# Patient Record
Sex: MALE | Race: WHITE | NOT HISPANIC OR LATINO | Employment: FULL TIME | URBAN - NONMETROPOLITAN AREA
[De-identification: names, ages, dates, MRNs, and addresses within clinical notes are randomized per-mention and may not be internally consistent; named-entity substitution may affect disease eponyms.]

---

## 2017-03-13 ENCOUNTER — OFFICE VISIT (OUTPATIENT)
Dept: GASTROENTEROLOGY | Facility: CLINIC | Age: 52
End: 2017-03-13

## 2017-03-13 VITALS
HEIGHT: 73 IN | BODY MASS INDEX: 27.04 KG/M2 | DIASTOLIC BLOOD PRESSURE: 80 MMHG | WEIGHT: 204 LBS | SYSTOLIC BLOOD PRESSURE: 122 MMHG | HEART RATE: 72 BPM | OXYGEN SATURATION: 98 %

## 2017-03-13 DIAGNOSIS — Z12.11 ENCOUNTER FOR SCREENING FOR MALIGNANT NEOPLASM OF COLON: Primary | ICD-10-CM

## 2017-03-13 PROCEDURE — S0260 H&P FOR SURGERY: HCPCS | Performed by: CLINICAL NURSE SPECIALIST

## 2017-03-13 RX ORDER — SODIUM, POTASSIUM,MAG SULFATES 17.5-3.13G
SOLUTION, RECONSTITUTED, ORAL ORAL
Qty: 2 BOTTLE | Refills: 0 | Status: ON HOLD | OUTPATIENT
Start: 2017-03-13 | End: 2017-04-14

## 2017-03-13 NOTE — PROGRESS NOTES
Logan Suarez  1965      3/13/2017  Chief Complaint   Patient presents with   • Colonoscopy     New patient ref by Dr. Hawley      Subjective   HPI  Logan Suarez is a 51 y.o. male who presents as a referral for preventative maintenance. He has no complaints of nausea or vomiting. No change in bowels. No wt loss. No BRBPR. No melena. There is a negative family hx for colon cancer. No abdominal pain.  Past Medical History   Diagnosis Date   • Depression    • Disease of thyroid gland    • Hearing loss    • Hypercholesteremia      Past Surgical History   Procedure Laterality Date   • Appendectomy     • Vasectomy       Outpatient Prescriptions Marked as Taking for the 3/13/17 encounter (Office Visit) with MATT Kiran   Medication Sig Dispense Refill   • atorvastatin (LIPITOR) 40 MG tablet Take 40 mg by mouth Daily.     • levothyroxine (SYNTHROID, LEVOTHROID) 137 MCG tablet Take 137 mcg by mouth Daily.     • NIACIN CR PO Take  by mouth Daily.     • PARoxetine (PAXIL) 20 MG tablet Take 20 mg by mouth Every Morning.       No Known Allergies  Social History     Social History   • Marital status:      Spouse name: N/A   • Number of children: N/A   • Years of education: N/A     Occupational History   • Not on file.     Social History Main Topics   • Smoking status: Never Smoker   • Smokeless tobacco: Not on file   • Alcohol use No   • Drug use: Not on file   • Sexual activity: Not on file     Other Topics Concern   • Not on file     Social History Narrative     Family History   Problem Relation Age of Onset   • Colon cancer Neg Hx    • Colon polyps Neg Hx      Health Maintenance   Topic Date Due   • HEPATITIS C SCREENING  1965   • TDAP/TD VACCINES (1 - Tdap) 12/01/1984   • INFLUENZA VACCINE  08/01/2016       REVIEW OF SYSTEMS  General: well appearing, no fever chills or sweats, no unexplained wt loss  HEENT: no acute visual or hearing disturbances  Cardiovascular: No chest pain or  "palpitations  Pulmonary: No shortness of breath, coughing, wheezing or hemoptysis  : No burning, urgency, hematuria, or dysuria  Musculoskeletal: No joint pain or stiffness  Peripheral: no edema  Skin: No lesions or rashes  Neuro: No dizziness, headaches, stroke, syncope  Endocrine: No hot or cold intolerances  Hematological: No blood dyscrasias    Objective   Vitals:    03/13/17 1449   BP: 122/80   Pulse: 72   SpO2: 98%   Weight: 204 lb (92.5 kg)   Height: 73\" (185.4 cm)     Body mass index is 26.91 kg/(m^2).    PHYSICAL EXAM  General: age appropriate well nourished well appearing, no acute distress  Head: normocephalic and atraumatic  Global assessment-supple  Neck-No JVD noted, no lymphadenopathy  Pulmonary-clear to auscultation bilaterally, normal respiratory effort  Cardiovascular-normal rate and rhythm, normal heart sounds, S1 and S2 noted  Abdomen-soft, non tender, non distended, normal bowel sounds all 4 quadrants, no hepatosplenomegaly noted  Extremities-No clubbing cyanosis or edema  Neuro-Non focal, converses appropriately, awake, alert, oriented    Assessment/Plan     Logan was seen today for colonoscopy.    Diagnoses and all orders for this visit:    Encounter for screening for malignant neoplasm of colon  -     SUPREP BOWEL PREP solution oral solution; Take as directed by office instructions provided  -     Case Request; Standing  -     Implement Anesthesia Orders Day of Procedure; Standing  -     Obtain Informed Consent; Standing  -     Verify Informed Consent; Standing  -     Verify bowel prep was successful; Standing  -     Case Request        COLONOSCOPY WITH ANESTHESIA (N/A)  Body mass index is 26.91 kg/(m^2).    Patient instructions on prep prior to procedure provided to the patient.    All risks, benefits, alternatives, and indications of colonoscopy procedure have been discussed with the patient. Risks to include perforation of the colon requiring possible surgery or colostomy, risk of " bleeding from biopsies or removal of colon tissue, possibility of missing a colon polyp or cancer, or adverse drug reaction.  Benefits to include the diagnosis and management of disease of the colon and rectum. Alternatives to include barium enema, radiographic evaluation, lab testing or no intervention. Pt verbalizes understanding and agrees.

## 2017-04-11 ENCOUNTER — ANESTHESIA EVENT (OUTPATIENT)
Dept: GASTROENTEROLOGY | Facility: HOSPITAL | Age: 52
End: 2017-04-11

## 2017-04-14 ENCOUNTER — HOSPITAL ENCOUNTER (OUTPATIENT)
Facility: HOSPITAL | Age: 52
Setting detail: HOSPITAL OUTPATIENT SURGERY
Discharge: HOME | End: 2017-04-14
Attending: INTERNAL MEDICINE | Admitting: INTERNAL MEDICINE

## 2017-04-14 ENCOUNTER — ANESTHESIA (OUTPATIENT)
Dept: GASTROENTEROLOGY | Facility: HOSPITAL | Age: 52
End: 2017-04-14

## 2017-04-14 VITALS
TEMPERATURE: 98.3 F | OXYGEN SATURATION: 98 % | DIASTOLIC BLOOD PRESSURE: 53 MMHG | WEIGHT: 204 LBS | BODY MASS INDEX: 27.04 KG/M2 | HEIGHT: 73 IN | HEART RATE: 73 BPM | RESPIRATION RATE: 17 BRPM | SYSTOLIC BLOOD PRESSURE: 113 MMHG

## 2017-04-14 DIAGNOSIS — Z12.11 ENCOUNTER FOR SCREENING FOR MALIGNANT NEOPLASM OF COLON: ICD-10-CM

## 2017-04-14 PROCEDURE — 45378 DIAGNOSTIC COLONOSCOPY: CPT | Performed by: INTERNAL MEDICINE

## 2017-04-14 PROCEDURE — 25010000002 PROPOFOL 10 MG/ML EMULSION: Performed by: NURSE ANESTHETIST, CERTIFIED REGISTERED

## 2017-04-14 RX ORDER — IBUPROFEN 200 MG
200 TABLET ORAL EVERY 6 HOURS PRN
COMMUNITY

## 2017-04-14 RX ORDER — SODIUM CHLORIDE 9 MG/ML
100 INJECTION, SOLUTION INTRAVENOUS CONTINUOUS
Status: CANCELLED | OUTPATIENT
Start: 2017-04-14

## 2017-04-14 RX ORDER — SODIUM CHLORIDE 0.9 % (FLUSH) 0.9 %
1-10 SYRINGE (ML) INJECTION AS NEEDED
Status: DISCONTINUED | OUTPATIENT
Start: 2017-04-14 | End: 2017-04-14 | Stop reason: HOSPADM

## 2017-04-14 RX ORDER — SODIUM CHLORIDE 0.9 % (FLUSH) 0.9 %
1-10 SYRINGE (ML) INJECTION AS NEEDED
Status: CANCELLED | OUTPATIENT
Start: 2017-04-14

## 2017-04-14 RX ORDER — SODIUM CHLORIDE 9 MG/ML
100 INJECTION, SOLUTION INTRAVENOUS CONTINUOUS
Status: DISCONTINUED | OUTPATIENT
Start: 2017-04-14 | End: 2017-04-14 | Stop reason: HOSPADM

## 2017-04-14 RX ORDER — PROPOFOL 10 MG/ML
VIAL (ML) INTRAVENOUS AS NEEDED
Status: DISCONTINUED | OUTPATIENT
Start: 2017-04-14 | End: 2017-04-14 | Stop reason: SURG

## 2017-04-14 RX ORDER — CHLORAL HYDRATE 500 MG
CAPSULE ORAL
COMMUNITY

## 2017-04-14 RX ADMIN — PROPOFOL 50 MG: 10 INJECTION, EMULSION INTRAVENOUS at 11:59

## 2017-04-14 RX ADMIN — PROPOFOL 50 MG: 10 INJECTION, EMULSION INTRAVENOUS at 11:52

## 2017-04-14 RX ADMIN — PROPOFOL 50 MG: 10 INJECTION, EMULSION INTRAVENOUS at 11:55

## 2017-04-14 RX ADMIN — SODIUM CHLORIDE 100 ML/HR: 9 INJECTION, SOLUTION INTRAVENOUS at 11:20

## 2017-04-14 RX ADMIN — PROPOFOL 50 MG: 10 INJECTION, EMULSION INTRAVENOUS at 11:56

## 2017-04-14 RX ADMIN — PROPOFOL 50 MG: 10 INJECTION, EMULSION INTRAVENOUS at 11:58

## 2017-04-14 RX ADMIN — PROPOFOL 50 MG: 10 INJECTION, EMULSION INTRAVENOUS at 11:53

## 2017-04-14 NOTE — ANESTHESIA POSTPROCEDURE EVALUATION
Patient: Logan Suarez    Procedure Summary     Date Anesthesia Start Anesthesia Stop Room / Location    04/14/17 1145 1207  [facility] /  [facility]       Procedure Diagnosis Surgeon Provider    COLONOSCOPY WITH ANESTHESIA (N/A ) Encounter for screening for malignant neoplasm of colon  (Encounter for screening for malignant neoplasm of colon [Z12.11]) MD Sim Schultz CRNA          Anesthesia Type: general  Last vitals  BP      Temp      Pulse     Resp      SpO2        Post Anesthesia Care and Evaluation    Patient location during evaluation: PHASE II  Patient participation: complete - patient participated  Level of consciousness: awake and sleepy but conscious  Pain score: 0  Pain management: adequate  Airway patency: patent  Anesthetic complications: No anesthetic complications    Cardiovascular status: acceptable  Respiratory status: acceptable  Hydration status: acceptable

## 2017-04-14 NOTE — PLAN OF CARE
Problem: Patient Care Overview (Adult)  Goal: Plan of Care Review  Outcome: Ongoing (interventions implemented as appropriate)    04/14/17 1201   Coping/Psychosocial Response Interventions   Plan Of Care Reviewed With patient   Patient Care Overview   Progress no change   Outcome Evaluation   Outcome Summary/Follow up Plan tolerating well         Problem: GI Endoscopy (Adult)  Goal: Signs and Symptoms of Listed Potential Problems Will be Absent or Manageable (GI Endoscopy)  Outcome: Ongoing (interventions implemented as appropriate)

## 2017-04-14 NOTE — PLAN OF CARE
Problem: Patient Care Overview (Adult)  Goal: Plan of Care Review  Outcome: Outcome(s) achieved Date Met:  04/14/17 04/14/17 1234   Coping/Psychosocial Response Interventions   Plan Of Care Reviewed With patient;spouse   Patient Care Overview   Progress improving   Outcome Evaluation   Outcome Summary/Follow up Plan DISCHARGE CRITERIA MET

## 2017-04-14 NOTE — PLAN OF CARE
Problem: GI Endoscopy (Adult)  Goal: Signs and Symptoms of Listed Potential Problems Will be Absent or Manageable (GI Endoscopy)  Outcome: Outcome(s) achieved Date Met:  04/14/17 04/14/17 1234   GI Endoscopy   Problems Assessed (GI Endoscopy) all   Problems Present (GI Endoscopy) none

## 2017-04-14 NOTE — ANESTHESIA PREPROCEDURE EVALUATION
Anesthesia Evaluation     Patient summary reviewed   no history of anesthetic complications:  NPO Status: > 8 hours   Airway   Mallampati: II  TM distance: >3 FB  Neck ROM: full  Dental      Pulmonary    (-) asthma, sleep apnea, not a smoker  Cardiovascular   Exercise tolerance: excellent (>7 METS)    (+) hyperlipidemia  (-) pacemaker, past MI, angina, cardiac stents      Neuro/Psych  (-) seizures, CVA  GI/Hepatic/Renal/Endo    (+)  hypothyroidism,   (-) GERD, liver disease, renal disease, diabetes    Musculoskeletal     Abdominal    Substance History      OB/GYN          Other                                    Anesthesia Plan    ASA 2     general     intravenous induction   Anesthetic plan and risks discussed with patient.

## 2017-04-17 ENCOUNTER — TELEPHONE (OUTPATIENT)
Dept: GASTROENTEROLOGY | Facility: CLINIC | Age: 52
End: 2017-04-17

## 2017-08-03 NOTE — H&P
"[facility]  Pre Procedure History & Physical    Chief Complaint:   Screening    Subjective     HPI:   Here for screening colonoscopy.  Please see office note dated 3/13/2017    Past Medical History:   Past Medical History:   Diagnosis Date   • Depression    • Disease of thyroid gland    • Hearing loss    • Hypercholesteremia        Past Surgical History:  [unfilled]    Family History:  Family History   Problem Relation Age of Onset   • Colon cancer Neg Hx    • Colon polyps Neg Hx        Social History:   reports that he has never smoked. He has never used smokeless tobacco. He reports that he does not drink alcohol or use illicit drugs.    Medications:   Prior to Admission medications    Medication Sig Start Date End Date Taking? Authorizing Provider   atorvastatin (LIPITOR) 40 MG tablet Take 40 mg by mouth Daily.   Yes Historical Provider, MD   ibuprofen (ADVIL,MOTRIN) 200 MG tablet Take 200 mg by mouth Every 6 (Six) Hours As Needed for Mild Pain (1-3).   Yes Historical Provider, MD   levothyroxine (SYNTHROID, LEVOTHROID) 137 MCG tablet Take 137 mcg by mouth Daily.   Yes Historical Provider, MD   NIACIN CR PO Take  by mouth Daily.   Yes Historical Provider, MD   Omega-3 Fatty Acids (FISH OIL) 1000 MG capsule capsule Take  by mouth Daily With Breakfast.   Yes Historical Provider, MD   PARoxetine (PAXIL) 20 MG tablet Take 20 mg by mouth Every Morning.   Yes Historical Provider, MD   SUPREP BOWEL PREP solution oral solution Take as directed by office instructions provided 3/13/17 4/14/17  MATT Kiran       Allergies:  Review of patient's allergies indicates no known allergies.    Objective     Blood pressure 130/76, pulse 83, temperature 98.3 °F (36.8 °C), temperature source Temporal Artery , resp. rate 18, height 73\" (185.4 cm), weight 204 lb (92.5 kg), SpO2 98 %.    Physical Exam   Constitutional: Pt is oriented to person, place, and in no distress.   HENT: Mouth/Throat: Oropharynx is " clear.   Cardiovascular: Normal rate, regular rhythm.    Pulmonary/Chest: Effort normal. No respiratory distress. No  wheezes.   Abdominal: Soft. Non-distended.  Skin: Skin is warm and dry.   Psychiatric: Mood, memory, affect and judgment appear normal.     Assessment/Plan     Diagnosis:  Screening:    Anticipated Surgical Procedure:     colonoscopy as scheduled    The risks, benefits, and alternatives of this procedure have been discussed with the patient or the responsible party- the patient understands and agrees to proceed.    EMR Dragon/transcription disclaimer: Much of this encounter note is an electronic transcription/translation of spoken language to printed text.  The electronic translation of spoken language may permit erroneous, or at times, nonsensical words or phrases to be inadvertently transcribed.  Although I have reviewed the note for such errors, some may still exist.    Alok Quintanilla MD  11:51 AM  4/14/2017     Name band;

## 2020-11-10 ENCOUNTER — OFFICE VISIT (OUTPATIENT)
Age: 55
End: 2020-11-10

## 2020-11-10 VITALS — HEART RATE: 90 BPM | OXYGEN SATURATION: 94 % | TEMPERATURE: 98.6 F

## 2020-11-13 LAB — SARS-COV-2, NAA: DETECTED

## 2020-11-16 ENCOUNTER — APPOINTMENT (OUTPATIENT)
Dept: GENERAL RADIOLOGY | Age: 55
End: 2020-11-16
Payer: COMMERCIAL

## 2020-11-16 ENCOUNTER — HOSPITAL ENCOUNTER (EMERGENCY)
Age: 55
Discharge: HOME | End: 2020-11-16
Payer: COMMERCIAL

## 2020-11-16 VITALS
RESPIRATION RATE: 14 BRPM | SYSTOLIC BLOOD PRESSURE: 128 MMHG | OXYGEN SATURATION: 96 % | DIASTOLIC BLOOD PRESSURE: 80 MMHG | HEIGHT: 73 IN | HEART RATE: 63 BPM | BODY MASS INDEX: 28.36 KG/M2 | WEIGHT: 214 LBS | TEMPERATURE: 98.4 F

## 2020-11-16 LAB
ALBUMIN SERPL-MCNC: 4 G/DL (ref 3.5–5.2)
ALP BLD-CCNC: 82 U/L (ref 40–130)
ALT SERPL-CCNC: 32 U/L (ref 5–41)
ANION GAP SERPL CALCULATED.3IONS-SCNC: 13 MMOL/L (ref 7–19)
AST SERPL-CCNC: 33 U/L (ref 5–40)
BASOPHILS ABSOLUTE: 0 K/UL (ref 0–0.2)
BASOPHILS RELATIVE PERCENT: 0.2 % (ref 0–1)
BILIRUB SERPL-MCNC: 0.6 MG/DL (ref 0.2–1.2)
BUN BLDV-MCNC: 19 MG/DL (ref 6–20)
CALCIUM SERPL-MCNC: 8.8 MG/DL (ref 8.6–10)
CHLORIDE BLD-SCNC: 102 MMOL/L (ref 98–111)
CO2: 24 MMOL/L (ref 22–29)
CREAT SERPL-MCNC: 0.9 MG/DL (ref 0.5–1.2)
D DIMER: 0.79 UG/ML FEU (ref 0–0.48)
EKG P AXIS: 10 DEGREES
EKG P-R INTERVAL: 174 MS
EKG Q-T INTERVAL: 430 MS
EKG QRS DURATION: 100 MS
EKG QTC CALCULATION (BAZETT): 430 MS
EKG T AXIS: -11 DEGREES
EOSINOPHILS ABSOLUTE: 0 K/UL (ref 0–0.6)
EOSINOPHILS RELATIVE PERCENT: 0.2 % (ref 0–5)
GFR AFRICAN AMERICAN: >59
GFR NON-AFRICAN AMERICAN: >60
GLUCOSE BLD-MCNC: 99 MG/DL (ref 74–109)
HCT VFR BLD CALC: 45.1 % (ref 42–52)
HEMOGLOBIN: 15.6 G/DL (ref 14–18)
IMMATURE GRANULOCYTES #: 0 K/UL
LYMPHOCYTES ABSOLUTE: 0.7 K/UL (ref 1.1–4.5)
LYMPHOCYTES RELATIVE PERCENT: 12 % (ref 20–40)
MCH RBC QN AUTO: 32.7 PG (ref 27–31)
MCHC RBC AUTO-ENTMCNC: 34.6 G/DL (ref 33–37)
MCV RBC AUTO: 94.5 FL (ref 80–94)
MONOCYTES ABSOLUTE: 0.2 K/UL (ref 0–0.9)
MONOCYTES RELATIVE PERCENT: 3.6 % (ref 0–10)
NEUTROPHILS ABSOLUTE: 4.6 K/UL (ref 1.5–7.5)
NEUTROPHILS RELATIVE PERCENT: 83.6 % (ref 50–65)
PDW BLD-RTO: 12.6 % (ref 11.5–14.5)
PLATELET # BLD: 168 K/UL (ref 130–400)
PMV BLD AUTO: 10.1 FL (ref 9.4–12.4)
POTASSIUM REFLEX MAGNESIUM: 4.2 MMOL/L (ref 3.5–5)
RBC # BLD: 4.77 M/UL (ref 4.7–6.1)
SODIUM BLD-SCNC: 139 MMOL/L (ref 136–145)
TOTAL PROTEIN: 7.3 G/DL (ref 6.6–8.7)
TROPONIN: <0.01 NG/ML (ref 0–0.03)
WBC # BLD: 5.5 K/UL (ref 4.8–10.8)

## 2020-11-16 PROCEDURE — 93010 ELECTROCARDIOGRAM REPORT: CPT | Performed by: INTERNAL MEDICINE

## 2020-11-16 PROCEDURE — 2580000003 HC RX 258: Performed by: NURSE PRACTITIONER

## 2020-11-16 PROCEDURE — 36415 COLL VENOUS BLD VENIPUNCTURE: CPT

## 2020-11-16 PROCEDURE — 80053 COMPREHEN METABOLIC PANEL: CPT

## 2020-11-16 PROCEDURE — 93005 ELECTROCARDIOGRAM TRACING: CPT | Performed by: NURSE PRACTITIONER

## 2020-11-16 PROCEDURE — 84484 ASSAY OF TROPONIN QUANT: CPT

## 2020-11-16 PROCEDURE — 85379 FIBRIN DEGRADATION QUANT: CPT

## 2020-11-16 PROCEDURE — 87040 BLOOD CULTURE FOR BACTERIA: CPT

## 2020-11-16 PROCEDURE — 85025 COMPLETE CBC W/AUTO DIFF WBC: CPT

## 2020-11-16 PROCEDURE — 99284 EMERGENCY DEPT VISIT MOD MDM: CPT

## 2020-11-16 PROCEDURE — 71045 X-RAY EXAM CHEST 1 VIEW: CPT

## 2020-11-16 PROCEDURE — 99999 PR OFFICE/OUTPT VISIT,PROCEDURE ONLY: CPT | Performed by: NURSE PRACTITIONER

## 2020-11-16 RX ORDER — LEVOTHYROXINE SODIUM 137 UG/1
137 TABLET ORAL DAILY
COMMUNITY

## 2020-11-16 RX ORDER — ATORVASTATIN CALCIUM 40 MG/1
40 TABLET, FILM COATED ORAL DAILY
COMMUNITY

## 2020-11-16 RX ORDER — SODIUM CHLORIDE 9 MG/ML
1000 INJECTION, SOLUTION INTRAVENOUS CONTINUOUS
Status: DISCONTINUED | OUTPATIENT
Start: 2020-11-16 | End: 2020-11-16 | Stop reason: HOSPADM

## 2020-11-16 RX ORDER — METHYLPREDNISOLONE 4 MG/1
TABLET ORAL
Qty: 1 KIT | Refills: 0 | Status: SHIPPED | OUTPATIENT
Start: 2020-11-16 | End: 2020-11-22

## 2020-11-16 RX ORDER — PAROXETINE HYDROCHLORIDE 20 MG/1
20 TABLET, FILM COATED ORAL EVERY MORNING
COMMUNITY

## 2020-11-16 RX ADMIN — SODIUM CHLORIDE 1000 ML: 9 INJECTION, SOLUTION INTRAVENOUS at 12:43

## 2020-11-16 SDOH — HEALTH STABILITY: MENTAL HEALTH: HOW OFTEN DO YOU HAVE A DRINK CONTAINING ALCOHOL?: NEVER

## 2020-11-16 ASSESSMENT — ENCOUNTER SYMPTOMS
ABDOMINAL PAIN: 0
SHORTNESS OF BREATH: 1

## 2020-11-16 NOTE — ED NOTES
Bed: 13  Expected date:   Expected time:   Means of arrival:   Comments:  Open at [id number], RN  11/16/20 3103

## 2020-11-16 NOTE — ED PROVIDER NOTES
[facility] EMERGENCY DEPT  eMERGENCY dEPARTMENT eNCOUnter      Pt Name: Jian Cantu  MRN: [id number]  [place] 1965  Date of evaluation: 11/16/2020  Provider: SHANON Bowens    CHIEF COMPLAINT       Chief Complaint   Patient presents with    Shortness of Breath     Tested positive for Covid last Tuesday, Now having increased SOA         HISTORY OF PRESENT ILLNESS   (Location/Symptom, Timing/Onset,Context/Setting, Quality, Duration, Modifying Factors, Severity)  Note limiting factors. formerly [facility] a 47 y.o. male who presents to the emergency department for evaluation of shortness of breath. Pt had positive outpatient testing for covid-19 ten days ago. He relates that he has had progressive worsening of shortness of breath. He relates that he has had soa with walking activity. He has had subjective fevers and malaise. He has had no abdominal pain, vomiting, diarrhea or chest pain. He denies known history of heart disease. He does not smoke cigarettes. [facility]    Nursing Notes were reviewed. REVIEW OF SYSTEMS    (2-9 systems for level 4, 10 or more for level 5)     Review of Systems   Constitutional: Positive for fever. Respiratory: Positive for shortness of breath. Cardiovascular: Negative for chest pain. Gastrointestinal: Negative for abdominal pain. All other systems reviewed and are negative. A complete review of systems was performed and is negative except as noted above in the HPI.        PAST MEDICAL HISTORY     Past Medical History:   Diagnosis Date    Depression     Hyperlipidemia     Thyroid disease          SURGICAL HISTORY       Past Surgical History:   Procedure Laterality Date    APPENDECTOMY      VASECTOMY           CURRENT MEDICATIONS       Previous Medications    ATORVASTATIN (LIPITOR) 40 MG TABLET    Take 40 mg by mouth daily    LEVOTHYROXINE (SYNTHROID) 137 MCG TABLET    Take 137 mcg by mouth Daily    PAROXETINE (PAXIL) 20 MG TABLET    Take 20 mg by mouth every morning ALLERGIES     Patient has no known allergies. FAMILY HISTORY     History reviewed. No pertinent family history.        SOCIAL HISTORY       Social History     Socioeconomic History    Marital status:      Spouse name: None    Number of children: None    Years of education: None    Highest education level: None   Occupational History    None   Social Needs    Financial resource strain: None    Food insecurity     Worry: None     Inability: None    Transportation needs     Medical: None     Non-medical: None   Tobacco Use    Smoking status: Never Smoker    Smokeless tobacco: Never Used   Substance and Sexual Activity    Alcohol use: Never     Frequency: Never    Drug use: Never    Sexual activity: Yes     Partners: Female   Lifestyle    Physical activity     Days per week: None     Minutes per session: None    Stress: None   Relationships    Social connections     Talks on phone: None     Gets together: None     Attends [employer] service: None     Active member of club or organization: None     Attends meetings of clubs or organizations: None     Relationship status: None    Intimate partner violence     Fear of current or ex partner: None     Emotionally abused: None     Physically abused: None     Forced sexual activity: None   Other Topics Concern    None   Social History Narrative    None       SCREENINGS    [place] Coma Scale  Eye Opening: Spontaneous  Best Verbal Response: Oriented  Best Motor Response: Obeys commands  [place] Coma Scale Score: 15        PHYSICAL EXAM    (up to 7 for level 4, 8 or more for level 5)     ED Triage Vitals [11/16/20 0955]   BP Temp Temp src Pulse Resp SpO2 Height Weight   136/82 98.5 °F (36.9 °C) -- 66 18 (!) 88 % 6' 1\" (1.854 m) 214 lb (97.1 kg)     Vitals:    11/16/20 0955 11/16/20 1230   BP: 136/82 128/76   Pulse: 66 66   Resp: 18 15   Temp: 98.5 °F (36.9 °C)    SpO2: (!) 88% 97%   Weight: 214 lb (97.1 kg)    Height: 6' 1\" (1.854 m) DIFFERENTIAL - Abnormal; Notable for the following components:       Result Value    MCV 94.5 (*)     MCH 32.7 (*)     Neutrophils % 83.6 (*)     Lymphocytes % 12.0 (*)     Lymphocytes Absolute 0.7 (*)     All other components within normal limits   D-DIMER, QUANTITATIVE - Abnormal; Notable for the following components:    D-Dimer, Quant 0.79 (*)     All other components within normal limits   CULTURE, BLOOD 1   CULTURE, BLOOD 2   COMPREHENSIVE METABOLIC PANEL W/ REFLEX TO MG FOR LOW K   TROPONIN       All other labs were within normal range or not returned as of this dictation. RE-ASSESSMENT     Return precautions discussed. He remains in no respiratory distress at discharge. EMERGENCY DEPARTMENT COURSE and DIFFERENTIALDIAGNOSIS/MDM:   Vitals:    Vitals:    11/16/20 0955 11/16/20 1230   BP: 136/82 128/76   Pulse: 66 66   Resp: 18 15   Temp: 98.5 °F (36.9 °C)    SpO2: (!) 88% 97%   Weight: 214 lb (97.1 kg)    Height: 6' 1\" (1.854 m)        MDM      CONSULTS:  None    PROCEDURES:  Unless otherwise notedbelow, none     Procedures    FINAL IMPRESSION     1. Pneumonia due to COVID-19 virus    2. Shortness of breath          DISPOSITION/PLAN   DISPOSITION        PATIENT REFERRED TO:  Murray Sierra, [facility]  [phone]    Schedule an appointment as soon as possible for a visit   As needed      DISCHARGE MEDICATIONS:       Current Discharge Medication List           Medication List      START taking these medications    methylPREDNISolone 4 MG tablet  Commonly known as:  MEDROL (SHAWNA)  Take by mouth as directed.         ASK your doctor about these medications    atorvastatin 40 MG tablet  Commonly known as:  LIPITOR     levothyroxine 137 MCG tablet  Commonly known as:  SYNTHROID     PARoxetine 20 MG tablet  Commonly known as:  PAXIL           Where to Get Your Medications      You can get these medications from any pharmacy    Bring a paper prescription for each of these medications  · methylPREDNISolone 4 MG tablet           (Pleasenote that portions of this note were completed with a voice recognition program.  Efforts were made to edit the dictations but occasionally words are mis-transcribed.)              Carlos Alba, APRN  11/16/20 8850

## 2020-11-17 ENCOUNTER — CARE COORDINATION (OUTPATIENT)
Dept: CARE COORDINATION | Age: 55
End: 2020-11-17

## 2020-11-17 NOTE — CARE COORDINATION
Patient contacted regarding ADT- diagnosis\". Discussed COVID-19 related testing which was available at this time. Test results were positive. Patient informed of results, if available? Yes     Care Transition Nurse/ Ambulatory Care Manager contacted the wifeGenaro by telephone to perform post discharge assessment. Call within 2 business days of discharge: Yes. Verified name and  with wifeGenaro as identifiers. Provided introduction to self, and explanation of the CTN/ACM role, and reason for call due to risk factors for infection and/or exposure to COVID-19. Symptoms reviewed with wifeGenaro who verbalized the following symptoms: fever, shortness of breath and diarrhea. Due to mild symptoms encounter was not routed to provider for escalation. Discussed follow-up appointments. If no appointment was previously scheduled, appointment scheduling offered: Yes. Contacted pt's PCP, they do not have remote appointment capability. Office staff, Jaylene, instructed to have pt call for any medical concerns/needs that they might assist with over the phone. [facility] follow up appointment(s): No future appointments. Non-[facility] follow up appointment(s): Dr. Martha Yee office unable to schedule pt for f/u. ACM informed pt's wife, Citlalli. ACM relayed that Citlalli may call PCP office for medical support/prescriptions as needed. She voiced understanding. Non-face-to-face services provided:  Obtained and reviewed discharge summary and/or continuity of care documents  Education of patient/family/caregiver/guardian to support self-management-Spoke to wife with patient's permission. Citlalli stated pt is eating and drinking today. He is SOA w ambulation but O2 saturation this morning at rest = 98%. Temp of 99 last night. Encouraged to push fluids, maintain good nutrition, hourly deep breathing. Reviewed household sanitizing to keep viral spread minimized.   Assessment and support for treatment adherence and medication management-Pt has started his prednisone. He has his routine medications. Wife reportd one bout of diarrhea this morning - Take Imodium OTC for diarrhea and enc contact PCP if this does not help control it. Tylenol for fever or aches but give lowest effective dose. Advance Care Planning:   Does patient have an Advance Directive:  not on file. Patient has following risk factors of: no known risk factors. CTN/ACM reviewed discharge instructions, medical action plan and red flags such as increased shortness of breath, increasing fever and signs of decompensation with wifeCitlalli who verbalized understanding. ACM reviewed that pt may return to the ED for worsening/alarming symptoms as needed. Discussed exposure protocols and quarantine with CDC Guidelines What to do if you are sick with coronavirus disease 2019.  WifeDarrell was given an opportunity for questions and concerns. The wifeDarrell agrees to contact the [employer] exposure line [phone], [facility] department [address]: ([phone]) and PCP office for questions related to their healthcare. CTN/ACM provided contact information for future needs. Reviewed and educated wifeDarrell on any new and changed medications related to discharge diagnosis     Patient/family/caregiver given information for GetWell Loop and agrees to enroll yes  Patient's preferred e-mail: Declined   Patient's preferred phone number: [phone]  Based on Loop alert triggers, patient will be contacted by nurse care manager for worsening symptoms. Pt will be further monitored by COVID Loop Team based on severity of symptoms and risk factors.

## 2020-11-18 ENCOUNTER — CARE COORDINATION (OUTPATIENT)
Dept: CARE COORDINATION | Age: 55
End: 2020-11-18

## 2020-11-21 LAB
BLOOD CULTURE, ROUTINE: NORMAL
CULTURE, BLOOD 2: NORMAL

## 2024-06-07 ENCOUNTER — OFFICE VISIT (OUTPATIENT)
Dept: INTERNAL MEDICINE | Facility: CLINIC | Age: 59
End: 2024-06-07
Payer: COMMERCIAL

## 2024-06-07 VITALS
WEIGHT: 208 LBS | OXYGEN SATURATION: 97 % | SYSTOLIC BLOOD PRESSURE: 130 MMHG | BODY MASS INDEX: 27.57 KG/M2 | HEIGHT: 73 IN | HEART RATE: 82 BPM | DIASTOLIC BLOOD PRESSURE: 80 MMHG | TEMPERATURE: 97.8 F

## 2024-06-07 DIAGNOSIS — Z96.21 COCHLEAR IMPLANT STATUS: ICD-10-CM

## 2024-06-07 DIAGNOSIS — Z11.59 ENCOUNTER FOR HEPATITIS C SCREENING TEST FOR LOW RISK PATIENT: ICD-10-CM

## 2024-06-07 DIAGNOSIS — E78.2 MIXED HYPERLIPIDEMIA: ICD-10-CM

## 2024-06-07 DIAGNOSIS — E03.9 ACQUIRED HYPOTHYROIDISM: ICD-10-CM

## 2024-06-07 DIAGNOSIS — G47.09 OTHER INSOMNIA: ICD-10-CM

## 2024-06-07 DIAGNOSIS — Z12.5 SCREENING FOR PROSTATE CANCER: ICD-10-CM

## 2024-06-07 DIAGNOSIS — F32.9 REACTIVE DEPRESSION: ICD-10-CM

## 2024-06-07 DIAGNOSIS — Z00.00 ANNUAL PHYSICAL EXAM: ICD-10-CM

## 2024-06-07 DIAGNOSIS — Z76.89 ENCOUNTER TO ESTABLISH CARE WITH NEW DOCTOR: Primary | ICD-10-CM

## 2024-06-07 DIAGNOSIS — Z79.899 LONG-TERM USE OF HIGH-RISK MEDICATION: ICD-10-CM

## 2024-06-07 LAB
AMPHET+METHAMPHET UR QL: NEGATIVE
AMPHETAMINE INTERNAL CONTROL: ABNORMAL
AMPHETAMINES UR QL: NEGATIVE
BARBITURATE INTERNAL CONTROL: ABNORMAL
BARBITURATES UR QL SCN: NEGATIVE
BENZODIAZ UR QL SCN: NEGATIVE
BENZODIAZEPINE INTERNAL CONTROL: ABNORMAL
BUPRENORPHINE INTERNAL CONTROL: ABNORMAL
BUPRENORPHINE SERPL-MCNC: NEGATIVE NG/ML
CANNABINOIDS SERPL QL: NEGATIVE
COCAINE INTERNAL CONTROL: ABNORMAL
COCAINE UR QL: NEGATIVE
EXPIRATION DATE: ABNORMAL
Lab: ABNORMAL
MDMA (ECSTASY) INTERNAL CONTROL: ABNORMAL
MDMA UR QL SCN: NEGATIVE
METHADONE INTERNAL CONTROL: ABNORMAL
METHADONE UR QL SCN: NEGATIVE
METHAMPHETAMINE INTERNAL CONTROL: ABNORMAL
MORPHINE INTERNAL CONTROL: ABNORMAL
MORPHINE/OPIATES SCREEN, URINE: NEGATIVE
OXYCODONE INTERNAL CONTROL: ABNORMAL
OXYCODONE UR QL SCN: NEGATIVE
PCP UR QL SCN: NEGATIVE
PHENCYCLIDINE INTERNAL CONTROL: ABNORMAL
PROPOXYPH UR QL SCN: NEGATIVE
PROPOXYPHENE INTERNAL CONTROL: ABNORMAL
THC INTERNAL CONTROL: ABNORMAL
TRICYCLIC ANTIDEPRESSANTS INTERNAL CONTROL: ABNORMAL
TRICYCLICS UR QL SCN: NEGATIVE

## 2024-06-07 RX ORDER — ZOLPIDEM TARTRATE 10 MG/1
10 TABLET ORAL NIGHTLY
COMMUNITY

## 2024-06-07 RX ORDER — NALOXONE HYDROCHLORIDE 4 MG/.1ML
1 SPRAY NASAL AS NEEDED
COMMUNITY
Start: 2023-10-04 | End: 2024-10-04

## 2024-06-07 NOTE — PROGRESS NOTES
"    Chief Complaint  Establish Care (Would like to talk about weaning off Paxil, but not for a couple of years when he retires.)  Annual exam    Subjective        Logan Suarez presents to [facility]  History of Present Illness  See below.     Objective   Vital Signs:  /80 (BP Location: Left arm, Patient Position: Sitting, Cuff Size: Adult)   Pulse 82   Temp 97.8 °F (36.6 °C) (Temporal)   Ht 185.4 cm (73\")   Wt 94.3 kg (208 lb)   SpO2 97%   BMI 27.44 kg/m²   Estimated body mass index is 27.44 kg/m² as calculated from the following:    Height as of this encounter: 185.4 cm (73\").    Weight as of this encounter: 94.3 kg (208 lb).     BMI is >= 25 and <30. (Overweight) The following options were offered after discussion;: weight loss educational material (shared in after visit summary)    Physical Exam  Constitutional:       Comments: Seen and discussed with his wife.   HENT:      Head: Normocephalic and atraumatic.      Right Ear: Tympanic membrane and ear canal normal.      Left Ear: Tympanic membrane and ear canal normal.      Ears:      Comments: Left scalp cochlear implant.  Eyes:      Conjunctiva/sclera: Conjunctivae normal.      Pupils: Pupils are equal, round, and reactive to light.   Cardiovascular:      Rate and Rhythm: Normal rate and regular rhythm.      Heart sounds: Normal heart sounds.   Pulmonary:      Effort: Pulmonary effort is normal. No respiratory distress.      Breath sounds: Normal breath sounds.   Musculoskeletal:         General: No swelling.      Cervical back: Neck supple.   Skin:     General: Skin is warm and dry.      Findings: No rash.   Neurological:      General: No focal deficit present.      Mental Status: He is alert and oriented to person, place, and time.   Psychiatric:         Mood and Affect: Mood normal.         Behavior: Behavior normal.         Thought Content: Thought content normal.         Judgment: Judgment normal.        Result " Review :  Labs provided by Dr. Hawley's office were drawn on 7/21/2023:  1.  CMP showed a slight elevation of glucose at 109.  Otherwise unremarkable.  Creatinine 1.08.  2.  Total cholesterol 165, HDL 37, LDL 91, triglycerides 218.  3.  CBC showed a hemoglobin of 14.9.  Otherwise unremarkable.  4.  TSH was slightly elevated at 5.220 with a T4, T3 uptake, free thyroxine index.  5.  PSA was normal at 0.3.    He had a colonoscopy in April 2017 with Dr. Quintanilla:           Assessment and Plan   Diagnoses and all orders for this visit:    1. Encounter to establish care with new doctor (Primary)    2. Annual physical exam  -     CBC & Differential; Future  -     Comprehensive metabolic panel; Future    3. Mixed hyperlipidemia  -     Lipid Panel; Future    4. Acquired hypothyroidism  -     TSH Rfx On Abnormal To Free T4; Future    5. Reactive depression    6. Cochlear implant status    7. Other insomnia  -     POC Medline 14 Panel Urine Drug Screen    8. Long-term use of high-risk medication  -     POC Medline 14 Panel Urine Drug Screen    9. Screening for prostate cancer  -     PSA SCREENING; Future    10. Encounter for hepatitis C screening test for low risk patient  -     Hepatitis C antibody; Future    Presents today to establish care.  He has been a long-term patient of Dr. Hawley.  He retired recently.    He has issues with mixed hyperlipidemia and is on multiple medications to control this.  His LDL was less than 100, but triglycerides were 218 last July.  HDL was 37.  Continue atorvastatin, niacin, omega-3 fatty acids.  Repeat lipid panel forthcoming in July.    He has hypothyroidism.  He has been on levothyroxine at 137 mcg since at least January 2022.  I was provided office notes through that timeframe.  His TSH was slightly elevated last July.  We will reassess on his upcoming labs.  No hypo or hyperthyroid symptoms.    He brings up the fact that he was placed on Paxil several years ago after his job started being  more stressful.  He finds medication effective and does not have untoward side effects, but would like to consider going off of the medication after he retires in a few years.  He was inquiring as to whether or not that would be possible.  Explained to him that we could weaning off the medication at the time of his choosing.    He follows with Dr. Moon at [place] and [place]  He has a left-sided cochlear implant.  His hearing loss did not start until adulthood.    Long-term Ambien with Dr. Hawley.  Obtained a negative UDS today.  CSA signed.    He will be due for repeat colonoscopy in April 2027.  He had a normal colonoscopy in April 2017 with Dr. Quintanilla.    PSA normal in July 2023.    Counseled on appropriate dental and vision screening.  He previously saw Dr. Castellanos with optometry for eye care.  He thinks he probably has not had an eye exam in at least 10 years.  We talked about getting an optometrist for a dilated eye exam.  His wife also previously worked for Dr. Castaneda with ophthalmology.  He goes to Dr. Alfonso Crespo for dentistry regularly.    Plan to obtain fasting labs in late July.    We will see him back in 6 months for recheck and then start to do his annual physical at the end of July each year.  He knows that he can call back sooner if problems.         Follow Up   Return in about 6 months (around 12/7/2024) for Recheck.  Patient was given instructions and counseling regarding his condition or for health maintenance advice. Please see specific information pulled into the AVS if appropriate.      DANIEL Gomez DO       Electronically signed by VIOLETTA Gomez DO, 06/07/24, 11:04 AM CDT.

## 2024-06-28 RX ORDER — PAROXETINE HYDROCHLORIDE 20 MG/1
20 TABLET, FILM COATED ORAL EVERY MORNING
Qty: 90 TABLET | Refills: 1 | Status: SHIPPED | OUTPATIENT
Start: 2024-06-28

## 2024-06-28 RX ORDER — LEVOTHYROXINE SODIUM 137 UG/1
137 TABLET ORAL DAILY
Qty: 90 TABLET | Refills: 1 | Status: SHIPPED | OUTPATIENT
Start: 2024-06-28

## 2024-06-28 RX ORDER — ATORVASTATIN CALCIUM 40 MG/1
40 TABLET, FILM COATED ORAL DAILY
Qty: 90 TABLET | Refills: 1 | Status: SHIPPED | OUTPATIENT
Start: 2024-06-28

## 2024-06-28 NOTE — TELEPHONE ENCOUNTER
Caller: Светлана Suarez    Relationship: Emergency Contact    Best call back number: [phone]     Requested Prescriptions:   Requested Prescriptions     Pending Prescriptions Disp Refills    atorvastatin (LIPITOR) 40 MG tablet 90 tablet      Sig: Take 1 tablet by mouth Daily.    PARoxetine (PAXIL) 20 MG tablet       Sig: Take 1 tablet by mouth Every Morning.    levothyroxine (SYNTHROID, LEVOTHROID) 137 MCG tablet       Sig: Take 1 tablet by mouth Daily.        Pharmacy where request should be sent: [facility] [address] [phone] [facility] [phone]      Last office visit with prescribing clinician: 6/7/2024   Last telemedicine visit with prescribing clinician: Visit date not found   Next office visit with prescribing clinician: Visit date not found     Does the patient have less than a 3 day supply:  [x] Yes  [] No    Would you like a call back once the refill request has been completed: [x] Yes [] No    If the office needs to give you a call back, can they leave a voicemail: [] Yes [] No    Rommel Rg Rep   06/28/24 12:43 CDT

## 2024-07-29 RX ORDER — FENOFIBRATE 145 MG/1
145 TABLET, COATED ORAL DAILY
Qty: 90 TABLET | Refills: 1 | Status: SHIPPED | OUTPATIENT
Start: 2024-07-29

## 2024-10-15 ENCOUNTER — TELEPHONE (OUTPATIENT)
Dept: INTERNAL MEDICINE | Facility: CLINIC | Age: 59
End: 2024-10-15

## 2024-10-15 NOTE — TELEPHONE ENCOUNTER
"    Caller: Logan Suarez \"Sherif\"    Relationship: Self    Best call back number: [phone]     Which medication are you concerned about: FENOFIBRATE    Who prescribed you this medication: DR. SAMPSON     When did you start taking this medication: OVER ONE MONTH AGO     What are your concerns:     PATIENT STATES HE HAS HAD ELEVATED KIDNEY LEVELS AFTER STARTING THIS MEDICATION. PATIENT IS GOING OUT OF [place] ON 10.17.24, AND IS WONDERING IF HE NEEDS TO CONTINUE TAKING THIS MEDICATION.     How long have you had these concerns: ONE MONTH     "

## 2024-10-28 ENCOUNTER — OFFICE VISIT (OUTPATIENT)
Dept: INTERNAL MEDICINE | Facility: CLINIC | Age: 59
End: 2024-10-28
Payer: COMMERCIAL

## 2024-10-28 VITALS
SYSTOLIC BLOOD PRESSURE: 130 MMHG | BODY MASS INDEX: 27.7 KG/M2 | TEMPERATURE: 96.9 F | HEIGHT: 73 IN | OXYGEN SATURATION: 98 % | DIASTOLIC BLOOD PRESSURE: 78 MMHG | WEIGHT: 209 LBS | HEART RATE: 68 BPM

## 2024-10-28 DIAGNOSIS — E78.2 MIXED HYPERLIPIDEMIA: ICD-10-CM

## 2024-10-28 DIAGNOSIS — R79.89 ELEVATED SERUM CREATININE: Primary | ICD-10-CM

## 2024-10-28 PROCEDURE — 99213 OFFICE O/P EST LOW 20 MIN: CPT | Performed by: INTERNAL MEDICINE

## 2024-10-28 NOTE — PROGRESS NOTES
"    Chief Complaint  Follow-up (Discuss lab results. Patient stopped taking fenofribrate about 2 weeks ago due to back pain and dark urine. )    Subjective        Logan Suarez presents to [facility]  Follow-up  See below.     Objective   Vital Signs:  /78 (BP Location: Left arm, Patient Position: Sitting, Cuff Size: Adult)   Pulse 68   Temp 96.9 °F (36.1 °C) (Temporal)   Ht 185.4 cm (73\")   Wt 94.8 kg (209 lb)   SpO2 98%   BMI 27.57 kg/m²   Estimated body mass index is 27.57 kg/m² as calculated from the following:    Height as of this encounter: 185.4 cm (73\").    Weight as of this encounter: 94.8 kg (209 lb).         Physical Exam  Constitutional:       Comments: Seen and discussed with his wife.   HENT:      Head: Normocephalic and atraumatic.      Comments: Cochlear implants.  Eyes:      Conjunctiva/sclera: Conjunctivae normal.      Pupils: Pupils are equal, round, and reactive to light.   Pulmonary:      Effort: Pulmonary effort is normal. No respiratory distress.   Musculoskeletal:         General: No swelling.   Skin:     General: Skin is warm and dry.      Findings: No rash.   Neurological:      General: No focal deficit present.      Mental Status: He is alert and oriented to person, place, and time.   Psychiatric:         Mood and Affect: Mood normal.         Behavior: Behavior normal.         Thought Content: Thought content normal.         Judgment: Judgment normal.        Result Review :  Creatinine was 1.15 on our labs in July 2024.  He had repeat labs through work recently and his creatinine was 1.34.         Assessment and Plan   Diagnoses and all orders for this visit:    1. Elevated serum creatinine (Primary)  -     Comprehensive metabolic panel    2. Mixed hyperlipidemia       Presents today for follow-up.    He was sent a concern about his serum creatinine to our attention recently.  He had had labs in September and also in October at work.  His creatinine " was 1.3 on each occasion.  They had previously gotten creatinine values of 1.0.  I had a creatinine value of 1.15 when I checked labs on him in July.    For context, I was unaware that the patient had been having lower back soreness and had also noticed some discolored urine.  He states that his urine had been dark for several days.    Now that I have the full picture in the office today, I do have concerns that he could have had a bout of nontraumatic rhabdomyolysis related to atorvastatin and fenofibrate coadministration.    He has stopped the medication for the last 2 weeks and is not planning on going back on it after discussion today.  He has had difficult to control triglycerides at times.  He will continue atorvastatin, niacin, and omega-3 fatty acids.    1 finding that points away from this is a diagnosis is the fact that his transaminases were not elevated on any of the labs, but I still feel like he probably had nontraumatic rhabdomyolysis and that suspicion is enough to have him not take the medication any longer.    We talked about repeating labs today.  If his serum creatinine remains elevated, then we will do urine studies and obtain renal ultrasound.    Triglycerides were 149 on 9/10 blood work from his employer.  There were 301 in July.      He has follow-up in December and we will keep that.  I will reach out to him tomorrow with his labs and further instructions if needed.      Follow Up   Return for Next scheduled follow up.  Patient was given instructions and counseling regarding his condition or for health maintenance advice. Please see specific information pulled into the AVS if appropriate.      DANIEL Gomez DO       Electronically signed by VIOLETTA Gomez DO, 10/28/24, 2:34 PM CDT.

## 2024-10-29 LAB
ALBUMIN SERPL-MCNC: 4.4 G/DL (ref 3.5–5.2)
ALBUMIN/GLOB SERPL: 2 G/DL
ALP SERPL-CCNC: 77 U/L (ref 39–117)
ALT SERPL-CCNC: 55 U/L (ref 1–41)
AST SERPL-CCNC: 31 U/L (ref 1–40)
BILIRUB SERPL-MCNC: 0.4 MG/DL (ref 0–1.2)
BUN SERPL-MCNC: 17 MG/DL (ref 6–20)
BUN/CREAT SERPL: 15.5 (ref 7–25)
CALCIUM SERPL-MCNC: 9.4 MG/DL (ref 8.6–10.5)
CHLORIDE SERPL-SCNC: 103 MMOL/L (ref 98–107)
CO2 SERPL-SCNC: 24.6 MMOL/L (ref 22–29)
CREAT SERPL-MCNC: 1.1 MG/DL (ref 0.76–1.27)
EGFRCR SERPLBLD CKD-EPI 2021: 77.8 ML/MIN/1.73
GLOBULIN SER CALC-MCNC: 2.2 GM/DL
GLUCOSE SERPL-MCNC: 87 MG/DL (ref 65–99)
POTASSIUM SERPL-SCNC: 4.6 MMOL/L (ref 3.5–5.2)
PROT SERPL-MCNC: 6.6 G/DL (ref 6–8.5)
SODIUM SERPL-SCNC: 138 MMOL/L (ref 136–145)

## 2024-12-09 RX ORDER — LEVOTHYROXINE SODIUM 137 UG/1
137 TABLET ORAL DAILY
Qty: 90 TABLET | Refills: 3 | Status: SHIPPED | OUTPATIENT
Start: 2024-12-09

## 2024-12-09 RX ORDER — PAROXETINE 20 MG/1
20 TABLET, FILM COATED ORAL EVERY MORNING
Qty: 90 TABLET | Refills: 3 | Status: SHIPPED | OUTPATIENT
Start: 2024-12-09

## 2024-12-09 RX ORDER — ATORVASTATIN CALCIUM 40 MG/1
40 TABLET, FILM COATED ORAL DAILY
Qty: 90 TABLET | Refills: 3 | Status: SHIPPED | OUTPATIENT
Start: 2024-12-09

## 2024-12-20 ENCOUNTER — OFFICE VISIT (OUTPATIENT)
Dept: INTERNAL MEDICINE | Facility: CLINIC | Age: 59
End: 2024-12-20
Payer: COMMERCIAL

## 2024-12-20 VITALS
WEIGHT: 206 LBS | SYSTOLIC BLOOD PRESSURE: 130 MMHG | TEMPERATURE: 97.5 F | DIASTOLIC BLOOD PRESSURE: 80 MMHG | OXYGEN SATURATION: 98 % | BODY MASS INDEX: 27.3 KG/M2 | HEIGHT: 73 IN | HEART RATE: 78 BPM

## 2024-12-20 DIAGNOSIS — E03.9 ACQUIRED HYPOTHYROIDISM: ICD-10-CM

## 2024-12-20 DIAGNOSIS — Z23 FLU VACCINE NEED: ICD-10-CM

## 2024-12-20 DIAGNOSIS — E78.2 MIXED HYPERLIPIDEMIA: Primary | ICD-10-CM

## 2024-12-20 DIAGNOSIS — G47.09 OTHER INSOMNIA: ICD-10-CM

## 2024-12-20 PROCEDURE — 90656 IIV3 VACC NO PRSV 0.5 ML IM: CPT | Performed by: INTERNAL MEDICINE

## 2024-12-20 PROCEDURE — 99214 OFFICE O/P EST MOD 30 MIN: CPT | Performed by: INTERNAL MEDICINE

## 2024-12-20 PROCEDURE — 90471 IMMUNIZATION ADMIN: CPT | Performed by: INTERNAL MEDICINE

## 2024-12-20 RX ORDER — ZOLPIDEM TARTRATE 10 MG/1
10 TABLET ORAL NIGHTLY
Qty: 30 TABLET | Refills: 5 | Status: SHIPPED | OUTPATIENT
Start: 2024-12-20

## 2024-12-20 NOTE — PROGRESS NOTES
"    Chief Complaint  Follow-up (3 month follow up.  Annual due 6/7/2025)    Subjective        Logan Suarez presents to [facility]  History of Present Illness  See below.     Objective   Vital Signs:  /80 (BP Location: Left arm, Patient Position: Sitting, Cuff Size: Adult)   Pulse 78   Temp 97.5 °F (36.4 °C) (Temporal)   Ht 185.4 cm (73\")   Wt 93.4 kg (206 lb)   SpO2 98%   BMI 27.18 kg/m²   Estimated body mass index is 27.18 kg/m² as calculated from the following:    Height as of this encounter: 185.4 cm (73\").    Weight as of this encounter: 93.4 kg (206 lb).         Physical Exam  HENT:      Head: Normocephalic and atraumatic.      Right Ear: Tympanic membrane and ear canal normal.      Left Ear: Tympanic membrane and ear canal normal.      Ears:      Comments: Cochlear implant on the left.     Mouth/Throat:      Mouth: Mucous membranes are moist.      Pharynx: Oropharynx is clear.   Eyes:      Conjunctiva/sclera: Conjunctivae normal.      Pupils: Pupils are equal, round, and reactive to light.   Cardiovascular:      Rate and Rhythm: Normal rate and regular rhythm.      Heart sounds: Normal heart sounds.   Pulmonary:      Effort: Pulmonary effort is normal. No respiratory distress.      Breath sounds: Normal breath sounds.   Musculoskeletal:         General: No swelling.   Skin:     General: Skin is warm and dry.      Findings: No rash.   Neurological:      General: No focal deficit present.      Mental Status: He is alert and oriented to person, place, and time.   Psychiatric:         Mood and Affect: Mood normal.         Behavior: Behavior normal.         Thought Content: Thought content normal.         Judgment: Judgment normal.        Result Review :  Reviewed his most recent labs with him again.  He would like to recheck his FLP, CMP, and thyroid studies today.         Assessment and Plan   Diagnoses and all orders for this visit:    1. Mixed hyperlipidemia " (Primary)  -     Lipid Panel  -     Comprehensive metabolic panel    2. Acquired hypothyroidism  -     TSH Rfx On Abnormal To Free T4    3. Other insomnia  -     zolpidem (AMBIEN) 10 MG tablet; Take 1 tablet by mouth Every Night.  Dispense: 30 tablet; Refill: 5    4. Flu vaccine need  -     Fluzone >6mos       Presents today for routine follow-up.  He has not been recently having any problems.  He is about to travel to [place] with his family for [employer].    He likely developed a mild form of nontraumatic rhabdomyolysis a few months ago related to being on atorvastatin and fenofibrate in conjunction with 1 another.  The fenofibrate was stopped.  We will reassess his lipids today.  Continues on atorvastatin and omega-3 fatty acids.    We will reassess CMP today.    We will reassess his thyroid status today.    He requested a refill on his Ambien today.  He is having no side effects or difficulties with the medication.  It remains effective.    He took the seasonal flu vaccine today.    Plan to have him back in 6 months for his annual physical.  He knows that he can reach out sooner if problems.      Follow Up   Return in about 3 months (around 3/20/2025) for Recheck.  Patient was given instructions and counseling regarding his condition or for health maintenance advice. Please see specific information pulled into the AVS if appropriate.      DANIEL Gomez DO       Electronically signed by VIOLETTA Gomez DO, 12/20/24, 9:58 AM CST.

## 2024-12-21 LAB
ALBUMIN SERPL-MCNC: 4.6 G/DL (ref 3.5–5.2)
ALBUMIN/GLOB SERPL: 1.9 G/DL
ALP SERPL-CCNC: 105 U/L (ref 39–117)
ALT SERPL-CCNC: 42 U/L (ref 1–41)
AST SERPL-CCNC: 24 U/L (ref 1–40)
BILIRUB SERPL-MCNC: 0.5 MG/DL (ref 0–1.2)
BUN SERPL-MCNC: 17 MG/DL (ref 6–20)
BUN/CREAT SERPL: 13.9 (ref 7–25)
CALCIUM SERPL-MCNC: 9.9 MG/DL (ref 8.6–10.5)
CHLORIDE SERPL-SCNC: 104 MMOL/L (ref 98–107)
CHOLEST SERPL-MCNC: 156 MG/DL (ref 0–200)
CO2 SERPL-SCNC: 27.9 MMOL/L (ref 22–29)
CREAT SERPL-MCNC: 1.22 MG/DL (ref 0.76–1.27)
EGFRCR SERPLBLD CKD-EPI 2021: 68.3 ML/MIN/1.73
GLOBULIN SER CALC-MCNC: 2.4 GM/DL
GLUCOSE SERPL-MCNC: 107 MG/DL (ref 65–99)
HDLC SERPL-MCNC: 28 MG/DL (ref 40–60)
LDLC SERPL CALC-MCNC: 83 MG/DL (ref 0–100)
POTASSIUM SERPL-SCNC: 4.6 MMOL/L (ref 3.5–5.2)
PROT SERPL-MCNC: 7 G/DL (ref 6–8.5)
SODIUM SERPL-SCNC: 140 MMOL/L (ref 136–145)
TRIGL SERPL-MCNC: 269 MG/DL (ref 0–150)
TSH SERPL DL<=0.005 MIU/L-ACNC: 1.64 UIU/ML (ref 0.27–4.2)
VLDLC SERPL CALC-MCNC: 45 MG/DL (ref 5–40)

## 2025-06-09 ENCOUNTER — OFFICE VISIT (OUTPATIENT)
Dept: INTERNAL MEDICINE | Facility: CLINIC | Age: 60
End: 2025-06-09
Payer: COMMERCIAL

## 2025-06-09 VITALS
SYSTOLIC BLOOD PRESSURE: 130 MMHG | HEIGHT: 73 IN | WEIGHT: 211 LBS | DIASTOLIC BLOOD PRESSURE: 84 MMHG | OXYGEN SATURATION: 98 % | TEMPERATURE: 97.1 F | HEART RATE: 89 BPM | BODY MASS INDEX: 27.96 KG/M2

## 2025-06-09 DIAGNOSIS — Z12.5 SCREENING FOR PROSTATE CANCER: ICD-10-CM

## 2025-06-09 DIAGNOSIS — E78.2 MIXED HYPERLIPIDEMIA: ICD-10-CM

## 2025-06-09 DIAGNOSIS — E03.9 ACQUIRED HYPOTHYROIDISM: ICD-10-CM

## 2025-06-09 DIAGNOSIS — Z00.00 ANNUAL PHYSICAL EXAM: Primary | ICD-10-CM

## 2025-06-09 DIAGNOSIS — F32.9 REACTIVE DEPRESSION: ICD-10-CM

## 2025-06-09 DIAGNOSIS — G47.09 OTHER INSOMNIA: ICD-10-CM

## 2025-06-09 PROCEDURE — 99396 PREV VISIT EST AGE 40-64: CPT | Performed by: INTERNAL MEDICINE

## 2025-06-09 PROCEDURE — 99214 OFFICE O/P EST MOD 30 MIN: CPT | Performed by: INTERNAL MEDICINE

## 2025-06-09 RX ORDER — ZOLPIDEM TARTRATE 10 MG/1
10 TABLET ORAL NIGHTLY
Qty: 30 TABLET | Refills: 5 | Status: SHIPPED | OUTPATIENT
Start: 2025-06-09

## 2025-06-09 NOTE — PROGRESS NOTES
"    Chief Complaint  Annual Exam    Subjective        Logan Suarez presents to [facility]  History of Present Illness  See below.     Objective   Vital Signs:  /84 (BP Location: Left arm)   Pulse 89   Temp 97.1 °F (36.2 °C) (Temporal)   Ht 185.4 cm (73\")   Wt 95.7 kg (211 lb)   SpO2 98%   BMI 27.84 kg/m²   Estimated body mass index is 27.84 kg/m² as calculated from the following:    Height as of this encounter: 185.4 cm (73\").    Weight as of this encounter: 95.7 kg (211 lb).     BMI is >= 25 and <30. (Overweight) The following options were offered after discussion;: weight loss educational material (shared in after visit summary)    Physical Exam  Constitutional:       Appearance: He is not ill-appearing.   HENT:      Head: Normocephalic and atraumatic.      Right Ear: Tympanic membrane and ear canal normal.      Left Ear: Tympanic membrane and ear canal normal.      Ears:      Comments: Left cochlear implant.     Mouth/Throat:      Mouth: Mucous membranes are moist.      Pharynx: Oropharynx is clear.   Eyes:      Conjunctiva/sclera: Conjunctivae normal.      Pupils: Pupils are equal, round, and reactive to light.   Cardiovascular:      Rate and Rhythm: Normal rate and regular rhythm.      Heart sounds: Normal heart sounds.   Pulmonary:      Effort: Pulmonary effort is normal. No respiratory distress.      Breath sounds: Normal breath sounds.   Musculoskeletal:         General: No swelling.      Cervical back: Neck supple.   Lymphadenopathy:      Cervical: No cervical adenopathy.   Skin:     General: Skin is warm and dry.      Findings: No rash.   Neurological:      General: No focal deficit present.      Mental Status: He is alert and oriented to person, place, and time.   Psychiatric:         Mood and Affect: Mood normal.         Behavior: Behavior normal.         Thought Content: Thought content normal.         Judgment: Judgment normal.        Result Review :  Most " recent labs were in December.  1.  CMP showed a slightly elevated ALT that had improved from October.  2.  TSH was 1.640.  3.  Total cholesterol 156, HDL 28, LDL 83, triglycerides 269.  This was an improvement from 301 last July.    CBC was fairly unremarkable last July.  PSA was normal last July.  Hepatitis C antibody nonreactive last July.         Assessment and Plan   Diagnoses and all orders for this visit:    1. Annual physical exam (Primary)    2. Acquired hypothyroidism  -     CBC & Differential; Future  -     Comprehensive metabolic panel; Future  -     TSH Rfx On Abnormal To Free T4; Future    3. Mixed hyperlipidemia  -     Lipid Panel; Future    4. Reactive depression    5. Other insomnia  -     zolpidem (AMBIEN) 10 MG tablet; Take 1 tablet by mouth Every Night.  Dispense: 30 tablet; Refill: 5    6. Screening for prostate cancer  -     PSA SCREENING; Future       Presents today for annual physical exam as well as follow-up of the below.    We will reevaluate his TSH to see if we need to adjust his levothyroxine dosing.    He needs his lipids reevaluated.  He is on atorvastatin and omega-3 fatty acids.  We did stop fenofibrate previously because we felt like it caused a mild nontraumatic rhabdomyolysis.    Continue Paxil.  He is not having any problems with this.    Continue Ambien.  He is not having any problems with this.  He did request a refill today.    He will be due for repeat colonoscopy in April 2027. He had a normal colonoscopy in April 2017 with Dr. Quintanilla.     His last PSA was normal on 7/26/2024.      Counseled on appropriate vision and dental screening. Recommneded [facility] for optometry. His dentist is Dr. Crespo.     He is due for 6-month labs, but his PSA is currently off cycle.  He is not fasting today and would like to wait and get everything done at once.  He will come back on or after Monday, July 28.  We will then start checking labs every 6 months from that point so  that he will be on a better cycle.    He will return in December for follow-up.  He knows that he can reach out sooner if any problems.      Follow Up   Return in about 6 months (around 12/9/2025) for Recheck.  Patient was given instructions and counseling regarding his condition or for health maintenance advice. Please see specific information pulled into the AVS if appropriate.      DANIEL Gomez DO       Electronically signed by VIOLETTA Gomez DO, 06/09/25, 3:08 PM CDT.

## (undated) DEVICE — THE CHANNEL CLEANING BRUSH IS A NYLON FLEXI BRUSH ATTACHED TO A FLEXIBLE PLASTIC SHEATH DESIGNED TO SAFELY REMOVE DEBRIS FROM FLEXIBLE ENDOSCOPES.

## (undated) DEVICE — MSK O2 MD CONCENTR A/ LF 7FT 1P/U

## (undated) DEVICE — Device: Brand: DEFENDO AIR/WATER/SUCTION AND BIOPSY VALVE

## (undated) DEVICE — CUFF,BP,DISP,1 TUBE,ADULT,HP: Brand: MEDLINE

## (undated) DEVICE — SENSR O2 OXIMAX FNGR A/ 18IN NONSTR

## (undated) DEVICE — ENDOGATOR AUXILIARY WATER JET CONNECTOR: Brand: ENDOGATOR

## (undated) DEVICE — TBG SMPL FLTR LINE NASL 02/C02 A/ BX/100